# Patient Record
Sex: MALE | Race: WHITE
[De-identification: names, ages, dates, MRNs, and addresses within clinical notes are randomized per-mention and may not be internally consistent; named-entity substitution may affect disease eponyms.]

---

## 2017-06-09 ENCOUNTER — HOSPITAL ENCOUNTER (EMERGENCY)
Dept: HOSPITAL 25 - ED | Age: 52
LOS: 1 days | Discharge: HOME | End: 2017-06-10
Payer: COMMERCIAL
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
## 2017-06-09 DIAGNOSIS — S81.831A: Primary | ICD-10-CM
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
## 2017-06-09 PROCEDURE — 99282 EMERGENCY DEPT VISIT SF MDM: CPT

## 2017-06-10 VITALS — DIASTOLIC BLOOD PRESSURE: 75 MMHG | SYSTOLIC BLOOD PRESSURE: 140 MMHG

## 2017-06-10 NOTE — RAD
HISTORY: Penetrating trauma to right lower extremity



COMPARISONS: None



VIEWS: 4, Frontal and lateral views of the right foreleg



FINDINGS:



BONE DENSITY: Normal.

BONES: There is no displaced fracture.    

JOINTS: There is no arthropathy.    

ALIGNMENT: There is no dislocation. 

SOFT TISSUES: Unremarkable.



OTHER FINDINGS: There is no radiopaque foreign body.



IMPRESSION: 

NO ACUTE OSSEOUS INJURY. NO RADIOPAQUE FOREIGN BODY. IF SYMPTOMS PERSIST, RECOMMEND REPEAT

IMAGING.

## 2017-06-10 NOTE — ED
Lower Extremity





- HPI Summary


HPI Summary: 


Pt here w/ RLE puncture wound - foot slipped on dock and scraped shin on dock -

lake water exposed. Bleeding. He cleaned with some peroxide as well. Tetanus is 

UTD. Area if throbbing but he declines pain medication at this time. Denies 

numbness, tingling, weakness. 





- History of Current Complaint


Chief Complaint: EDLacSutureRecheck


Stated Complaint: RIGHT LEG LAC


Time Seen by Provider: 06/09/17 23:14


Hx Obtained From: Patient


Pain Intensity: 5





- Allergies/Home Medications


Allergies/Adverse Reactions: 


 Allergies











Allergy/AdvReac Type Severity Reaction Status Date / Time


 


No Known Allergies Allergy   Verified 06/09/17 22:23














PMH/Surg Hx/FS Hx/Imm Hx


Previously Healthy: Yes


Endocrine/Hematology History: 


   Denies: Hx Anticoagulant Therapy, Hx Blood Disorders





- Immunization History


Date of Tetanus Vaccine: unknown


Infectious Disease History: Yes


Infectious Disease History: 


   Denies: Traveled Outside the [place] in Last 30 Days





- Social History


Occupation: Employed Full-time


Lives: With Family


Alcohol Use: Occasionally


Hx Substance Use: No


Substance Use Type: Reports: None


Hx Tobacco Use: No


Smoking Status (MU): Never Smoked Tobacco





Review of Systems


Constitutional: Negative


Negative: Fever, Chills


Positive: no symptoms reported


Musculoskeletal: Other - shin is sore where he attained injury


Skin: Other - see HPI


Neurological: Negative


Negative: Weakness, Paresthesia, Numbness


Psychological: Normal


All Other Systems Reviewed And Are Negative: Yes





Physical Exam


Triage Information Reviewed: Yes


Vital Signs On Initial Exam: 


 Initial Vitals











Temp Pulse Resp BP Pulse Ox


 


 98.3 F   67   16   142/97   99 


 


 06/09/17 22:18  06/09/17 22:18  06/09/17 22:18  06/09/17 22:18  06/09/17 22:18











Vital Signs Reviewed: Yes


Appearance: Positive: Well-Appearing, No Pain Distress - mild, Well-Nourished


Skin: Positive: Warm - gouged puncture wound over Rt middle 1/3 of anterior 

tibial region - oozing blood


Head/Face: Positive: Normal Head/Face Inspection


Eyes: Positive: Normal


ENT: Positive: Hearing grossly normal


Respiratory/Lung Sounds: Positive: Breath Sounds Present


Cardiovascular: Positive: Normal, Pulses are Symmetrical in both Upper and 

Lower Extremities


Musculoskeletal: Positive: Normal, Strength/ROM Intact


Neurological: Positive: Normal, Sensory/Motor Intact, Alert, Oriented to Person 

Place, Time, CN Intact II-III


Psychiatric: Positive: Normal





Procedures





- Laceration/Wound Repair


  ** 1


Location: upper extremity - Rt LE


Description: Irregular - jagged puncture wound


Length, Depth and Shape: 3mm puncture wound x 4mm deep


Betadine Prep?: No


Irrigated w/ Saline (ccs): 250 - hibaclens solution


Laceration/Wound Explored: contaminated - wood flecks


Number of Sutures: 0


Layer Closure?: No


Sterile Dressing Applied?: Yes - sterile saline packing covered w/ sterile 

gauze and wrapped w/ coban





Diagnostics





- Vital Signs


 Vital Signs











  Temp Pulse Resp BP Pulse Ox


 


 06/09/17 23:18  98.3 F  67  18  142/87  100


 


 06/09/17 22:18  98.3 F  67  16  142/97  99














- Laboratory


Lab Statement: Any lab studies that have been ordered have been reviewed, and 

results considered in the medical decision making process.





Lower Extremity Course/Dx





- Course


Course Of Treatment: Discussed option of closure with mattress suture d/t area 

of high tension vs. leaving open healing by second intention alone vs. packing w

/ healing by second intention w/ Dr. Martinez. Pt agreed to packing and will 

return to  ED in 2 days for packing change, wound check. Started anbx d/t 

nature of injury. Initially considered cipro to cover pseudomonas however pt is 

very active and risk of tendon injury is a serious concern for him (plans to 

run a 13 mile race in a couple of weeks). Irrigated area well and care taken to 

pack in sterile fashion so started augmentin to cover staph/strep and a few 

atypicals. Close monitoring advised and danger s/sx of when to return to ED 

reviewed. Pt voices understanding.





- Diagnoses


Provider Diagnoses: 


 Puncture wound of right lower leg








- Physician Notifications


Discussed Care Of Patient With: Dr. Aceves





Discharge





- Discharge Plan


Condition: Stable


Disposition: HOME


Prescriptions: 


Amoxicillin/Clavulanate TAB* [Augmentin *] 875 mg PO BID #19 tab


Patient Education Materials:  Puncture Wound (ED)


Referrals: 


No Primary Care Phys,NOPCP [Primary Care Provider] - 


Additional Instructions: 


You have sterile saline packing in your wound at this time. Keep pressure 

dressing in place until wound dressing change in 2 days (return to ED for wound 

cleanse and packing change).


Rest, Ice, Compress, Elevate


Ibuprofen with food for pain - alternate with acetaminophen


Complete antibiotics


*If you develop redness, swelling, purulent drainage, fever, streaking, return 

to ED

## 2017-06-11 ENCOUNTER — HOSPITAL ENCOUNTER (EMERGENCY)
Dept: HOSPITAL 25 - ED | Age: 52
Discharge: HOME | End: 2017-06-11
Payer: COMMERCIAL

## 2017-06-11 VITALS — DIASTOLIC BLOOD PRESSURE: 80 MMHG | SYSTOLIC BLOOD PRESSURE: 130 MMHG
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
## 2017-06-11 DIAGNOSIS — S80.921D: Primary | ICD-10-CM

## 2017-06-11 PROCEDURE — 99282 EMERGENCY DEPT VISIT SF MDM: CPT

## 2017-07-04 NOTE — ED
ED Suture/Wound Check





- HPI Summary


HPI Summary: 


Pt here for Rt shin wound recheck and packing change from puncture wound injury 

2 days ago. Dressing remains in place and is dry. Area is sore - has not been 

resting - has been doing yard work (ie. squatting, kneeling, bending, etc). 

Denies fever, chills, N/V, numbness, tingling, weakness. Taking medications as 

directed.  





- History Of Current Complaint


Chief Complaint: EDGeneral


Stated Complaint: WOUND RECHECK


Time Seen by Provider: 06/11/17 18:15


Hx Obtained From: Patient


Pain Intensity: 4


Pain Scale Used: 0-10 Numeric





- Allergies/Home Medications


Allergies/Adverse Reactions: 


 Allergies











Allergy/AdvReac Type Severity Reaction Status Date / Time


 


No Known Allergies Allergy   Verified 06/09/17 22:23














PMH/Surg Hx/FS Hx/Imm Hx


Previously Healthy: Yes


Endocrine/Hematology History: 


   Denies: Hx Anticoagulant Therapy, Hx Blood Disorders





- Immunization History


Date of Tetanus Vaccine: unknown


Infectious Disease History: No


Infectious Disease History: 


   Denies: Traveled Outside the [place] in Last 30 Days





- Social History


Alcohol Use: Occasionally


Hx Substance Use: No


Substance Use Type: Reports: None


Hx Tobacco Use: No


Smoking Status (MU): Never Smoked Tobacco





Review of Systems


Negative: Fever, Chills


All Other Systems Reviewed And Are Negative: Yes





Physical Exam


Triage Information Reviewed: Yes


Vital Signs On Initial Exam: 


 Initial Vitals











Temp Pulse Resp BP Pulse Ox


 


 98.4 F   78   18   130/80   98 


 


 06/11/17 17:24  06/11/17 17:24  06/11/17 17:24  06/11/17 17:24  06/11/17 17:24











Vital Signs Reviewed: Yes


Appearance: Positive: Well-Appearing, No Pain Distress, Well-Nourished


Skin: Positive: Warm - Rt shin w/ erythema at edges of bandage border - bandage 

appears to be snuggly in place - dry; upon removal, serosanginous d/c from 

wound onto bandage; packing removed w/o difficulty, irrigated w/ sterile saline 

and repacked w/ sterile gauze strip; erythema reduces once bandage removed and 

no erythema about wound itself - suspect this was from pressure of bandage 

itself - no streaking - pt tolerated dressing change well


Head/Face: Positive: Normal Head/Face Inspection


Respiratory/Lung Sounds: Positive: Breath Sounds Present


Cardiovascular: Positive: Pulses are Symmetrical in both Upper and Lower 

Extremities


Musculoskeletal: Positive: Normal, Strength/ROM Intact


Neurological: Positive: Normal, Sensory/Motor Intact


Psychiatric: Positive: Normal





Procedures





- Procedure Summary


Procedure Summary: 





see PE for dressing change details





Diagnostics





- Vital Signs


 Vital Signs











  Temp Pulse Resp BP Pulse Ox


 


 06/11/17 19:13  99.2 F    


 


 06/11/17 17:26  98.4 F  79  18  130/80  99


 


 06/11/17 17:24  98.4 F  78  18  130/80  98














- Laboratory


Lab Statement: Any lab studies that have been ordered have been reviewed, and 

results considered in the medical decision making process.





Course/Dx





- Course


Course Of Treatment: Pt presents for wound recheck and dressing change. Appears 

to be healing well and w/o acute infection. Packign and dressing changed - 

looser this time to reduce risk of swelling and skin irritation. Advised pt not 

to be so aggressive with the area to prevent aggravation and prevention of 

wound healing. WIll elevate, rest and ice as needed. F/u w/ wound clinic as 

directed. Return w/ danger s/sx as needed. Pt agrees w/ plan.





- Clinical Impression


Provider Diagnoses: 


 Encounter for wound re-check








Discharge





- Discharge Plan


Condition: Stable


Disposition: HOME


Patient Education Materials:  Puncture Wound (ED)


[place]errals: 


[facility] [place]


No Primary Care Phys,NOPCP [Primary Care Provider] - 


Additional Instructions: 


Your wound appears to be healing well however you have developed some redness 

above and below the wound which was presumed to be from a prolonged pressure 

dressing worn for 2 days along with activity, increasing inflammation of the 

already inflammed tissue. Your packing was removed today, wound cleaned and 

repacked - triple anbx dressing + Telfa + gauze + ACE wrap. Due to the risky 

nature of this wound (ie. injured by wood and lake water exposure), it is 

advised that you follow-up with the wound clinic in the next 1-2 days. Call 

tomorrow morning to schedule an appointment. Continue your antibiotics unless 

directed otherwise.





Rest, ice, elevate


If wound dressing becomes saturated with blood, remove - wash wound with soap 

and water - rinse well and reapply triple anbx ointment + clean dressing





Wound Clinic:


[phone]





You may also establish with a PCP -referral line provided today.


*If you develop worsening of redness, purulent drainage, excessive bleeding, 

fever, chills, return to ED

## 2018-12-04 ENCOUNTER — HOSPITAL ENCOUNTER (INPATIENT)
Dept: HOSPITAL 25 - ED | Age: 53
LOS: 2 days | Discharge: HOME | DRG: 174 | End: 2018-12-06
Attending: HOSPITALIST | Admitting: HOSPITALIST
Payer: COMMERCIAL

## 2018-12-04 DIAGNOSIS — Z72.89: ICD-10-CM

## 2018-12-04 DIAGNOSIS — I49.3: ICD-10-CM

## 2018-12-04 DIAGNOSIS — Z83.3: ICD-10-CM

## 2018-12-04 DIAGNOSIS — I47.2: ICD-10-CM

## 2018-12-04 DIAGNOSIS — I21.4: Primary | ICD-10-CM

## 2018-12-04 DIAGNOSIS — R31.9: ICD-10-CM

## 2018-12-04 DIAGNOSIS — Z85.71: ICD-10-CM

## 2018-12-04 DIAGNOSIS — Z79.82: ICD-10-CM

## 2018-12-04 DIAGNOSIS — Z82.49: ICD-10-CM

## 2018-12-04 DIAGNOSIS — I25.10: ICD-10-CM

## 2018-12-04 DIAGNOSIS — Z79.02: ICD-10-CM

## 2018-12-04 DIAGNOSIS — R79.1: ICD-10-CM

## 2018-12-04 LAB
BASOPHILS # BLD AUTO: 0 10^3/UL (ref 0–0.2)
EOSINOPHIL # BLD AUTO: 0.1 10^3/UL (ref 0–0.6)
HCT VFR BLD AUTO: 44 % (ref 42–52)
HGB BLD-MCNC: 15.5 G/DL (ref 14–18)
INR PPP/BLD: 0.95 (ref 0.77–1.02)
LYMPHOCYTES # BLD AUTO: 1.3 10^3/UL (ref 1–4.8)
MCH RBC QN AUTO: 33 PG (ref 27–31)
MCHC RBC AUTO-ENTMCNC: 35 G/DL (ref 31–36)
MCV RBC AUTO: 94 FL (ref 80–94)
MONOCYTES # BLD AUTO: 0.7 10^3/UL (ref 0–0.8)
NEUTROPHILS # BLD AUTO: 6.9 10^3/UL (ref 1.5–7.7)
NRBC # BLD AUTO: 0 10^3/UL
NRBC BLD QL AUTO: 0.1
PLATELET # BLD AUTO: 176 10^3/UL (ref 150–450)
RBC # BLD AUTO: 4.7 10^6/UL (ref 4–5.4)
WBC # BLD AUTO: 9 10^3/UL (ref 3.5–10.8)

## 2018-12-04 PROCEDURE — 85610 PROTHROMBIN TIME: CPT

## 2018-12-04 PROCEDURE — 93454 CORONARY ARTERY ANGIO S&I: CPT

## 2018-12-04 PROCEDURE — 85347 COAGULATION TIME ACTIVATED: CPT

## 2018-12-04 PROCEDURE — 93005 ELECTROCARDIOGRAM TRACING: CPT

## 2018-12-04 PROCEDURE — 4A023N7 MEASUREMENT OF CARDIAC SAMPLING AND PRESSURE, LEFT HEART, PERCUTANEOUS APPROACH: ICD-10-PCS | Performed by: SPECIALIST

## 2018-12-04 PROCEDURE — 71045 X-RAY EXAM CHEST 1 VIEW: CPT

## 2018-12-04 PROCEDURE — 83605 ASSAY OF LACTIC ACID: CPT

## 2018-12-04 PROCEDURE — 84484 ASSAY OF TROPONIN QUANT: CPT

## 2018-12-04 PROCEDURE — 93306 TTE W/DOPPLER COMPLETE: CPT

## 2018-12-04 PROCEDURE — 83735 ASSAY OF MAGNESIUM: CPT

## 2018-12-04 PROCEDURE — C1769 GUIDE WIRE: HCPCS

## 2018-12-04 PROCEDURE — 85730 THROMBOPLASTIN TIME PARTIAL: CPT

## 2018-12-04 PROCEDURE — C1876 STENT, NON-COA/NON-COV W/DEL: HCPCS

## 2018-12-04 PROCEDURE — 99157 MOD SED OTHER PHYS/QHP EA: CPT

## 2018-12-04 PROCEDURE — 99285 EMERGENCY DEPT VISIT HI MDM: CPT

## 2018-12-04 PROCEDURE — 83880 ASSAY OF NATRIURETIC PEPTIDE: CPT

## 2018-12-04 PROCEDURE — 80053 COMPREHEN METABOLIC PANEL: CPT

## 2018-12-04 PROCEDURE — B2111ZZ FLUOROSCOPY OF MULTIPLE CORONARY ARTERIES USING LOW OSMOLAR CONTRAST: ICD-10-PCS | Performed by: SPECIALIST

## 2018-12-04 PROCEDURE — 36415 COLL VENOUS BLD VENIPUNCTURE: CPT

## 2018-12-04 PROCEDURE — 85027 COMPLETE CBC AUTOMATED: CPT

## 2018-12-04 PROCEDURE — 80048 BASIC METABOLIC PNL TOTAL CA: CPT

## 2018-12-04 PROCEDURE — 84443 ASSAY THYROID STIM HORMONE: CPT

## 2018-12-04 PROCEDURE — 87641 MR-STAPH DNA AMP PROBE: CPT

## 2018-12-04 PROCEDURE — C1725 CATH, TRANSLUMIN NON-LASER: HCPCS

## 2018-12-04 PROCEDURE — 82553 CREATINE MB FRACTION: CPT

## 2018-12-04 PROCEDURE — 80061 LIPID PANEL: CPT

## 2018-12-04 PROCEDURE — 85025 COMPLETE CBC W/AUTO DIFF WBC: CPT

## 2018-12-04 PROCEDURE — 81003 URINALYSIS AUTO W/O SCOPE: CPT

## 2018-12-04 PROCEDURE — 02703ZZ DILATION OF CORONARY ARTERY, ONE ARTERY, PERCUTANEOUS APPROACH: ICD-10-PCS | Performed by: SPECIALIST

## 2018-12-04 PROCEDURE — 99156 MOD SED OTH PHYS/QHP 5/>YRS: CPT

## 2018-12-04 PROCEDURE — 82550 ASSAY OF CK (CPK): CPT

## 2018-12-04 PROCEDURE — 027034Z DILATION OF CORONARY ARTERY, ONE ARTERY WITH DRUG-ELUTING INTRALUMINAL DEVICE, PERCUTANEOUS APPROACH: ICD-10-PCS | Performed by: SPECIALIST

## 2018-12-04 PROCEDURE — C1887 CATHETER, GUIDING: HCPCS

## 2018-12-04 RX ADMIN — SODIUM CHLORIDE SCH MLS/HR: 900 IRRIGANT IRRIGATION at 16:56

## 2018-12-04 RX ADMIN — ATORVASTATIN CALCIUM SCH MG: 80 TABLET, FILM COATED ORAL at 20:59

## 2018-12-04 RX ADMIN — TICAGRELOR SCH MG: 90 TABLET ORAL at 20:59

## 2018-12-04 RX ADMIN — SODIUM CHLORIDE SCH MLS/HR: 900 IRRIGANT IRRIGATION at 13:51

## 2018-12-04 NOTE — HP
CC:  Dr. Reardon; Dr. Melendrez *

 

HISTORY AND PHYSICAL:

 

DATE OF ADMISSION:  18

 

TIME OF EVALUATION:  9:40 a.m.

 

PRIMARY CARE PROVIDER:  Dr. Reardon.

 

CONSULTING CARDIOLOGIST:  Dr. Melendrez.

 

CHIEF COMPLAINT:  Chest pain.

 

HISTORY OF PRESENT ILLNESS:  Mr. Hamilton is a 53-year-old male with no 
significant past medical history who presented to the emergency room with 
complaints of chest pain.

 

He states that he was in his usual state of health until last night while he 
was watching TV and working on his computer and he started to experience 
retrosternal chest discomfort that he rates as an 8/10.  He states that the 
pain was not radiating.  He cannot describe the nature of the pain, but he 
thought that due to the location, this could be heartburn, so he asked his wife 
to go buy some Tums. He took the medication, had some relief, but states that 
he was uncomfortable the whole night.  This morning, the patient woke up and 
was planning to go and exercise as usual, but as the discomfort was still 
present, he decided to come to the emergency room for further evaluation.  He 
denies palpitations, nausea, vomiting, diaphoresis, or dyspnea.

 

The patient states he saw his PCP a couple weeks ago and was told "everything 
was fine."  He describes having "borderline cholesterol" and the plan was to 
continue diet and exercise.  He states that he has not been exercising as much 
as he usually does because he hurt his back and was taking some ibuprofen for 
pain.

 

He denies fever, chills, cough, or any similar episodes in the past.

 

PAST MEDICAL HISTORY:  The patient denies.

 

FAMILY HISTORY:  His maternal grandfather  of a heart attack in his early 
40s. Father has diabetes.  Mother had polymyositis and Hodgkin's lymphoma.

 

SOCIAL HISTORY:  The patient denies tobacco and drug use.  He occasionally 
drinks alcohol.  Surrogate decision maker is his wife, Daiana Hamilton, phone 
number is [phone].

 

REVIEW OF SYSTEMS:  A 14-review of systems was performed and all the pertinent 
negatives and positive findings as per the HPI.

 

                               PHYSICAL EXAMINATION

 

GENERAL:  The patient is a pleasant, middle-aged gentleman, lying in the ED 
stretcher, in no acute distress.

 

VITAL SIGNS:  Temperature 97.6, heart rate 66, respiratory rate 16, oxygen 
saturation 98% on room air, blood pressure 133/84.

 

HEENT:  Pupils are equal.  Moist mucous membranes.

 

CHEST:  Breath sounds present bilaterally with no added sounds.

 

CVS:  Normal S1 and S2.  Regular rate and rhythm.

 

ABDOMEN:  Soft, bowel sounds are present.

 

EXTREMITIES:  No edema.

 

NEUROLOGIC:  He is alert and oriented x3.  Able to move all 4 extremities.

 

 DIAGNOSTIC STUDIES/LAB DATA:  The patient had a CBC that showed a WBC of 9, 
hemoglobin of 15.5, hematocrit of 44, platelets of 176 with 76% neutrophils.  
APTT is 30.8.  Chemistry showed sodium 137, potassium 4.4, chloride 103, bicarb 
27, BUN of 18, creatinine of 0.9, glucose of 121, lactic acid of 1, calcium 10.1
, magnesium 1.9.  LFTs showed total bilirubin of 0.6, AST of 56, ALT of 30, alk 
phos of 81, CPK of 142, CK-MB of 107, troponin 3.06, BNP 43, TSH 1.4.

 

Urinalysis was negative.

 

EKG done on 18 at 8:08 showed sinus bradycardia at 49 beats per minute 
with Q waves in II, III and aVF.  Peak Q waves in V3 and V4.  No prior EKG to 
compare.

 

Chest x-ray showed no active cardiopulmonary disease.

 

Transthoracic echocardiogram showed mild concentric LVH with ejection fraction 
of 45% to 50%, mildly decreased left ventricular systolic function with mid 
inferior and apical inferior wall segments are hypokinetic.

 

ASSESSMENT AND PLAN:  Mr. Hamilton is a 53-year-old male with no significant past 
medical history who presents to the emergency room with more than 12 hours of 
chest discomfort, found to have a non-ST elevation myocardial infarction.

 

1.  Non-ST elevation myocardial infarction.  The patient is having an inferior 
wall myocardial infarction with wall motion abnormalities seen on his EKG.  He 
has had pain for more than 12 hours and he already shows elevation of AST, CPK, 
CK-MB and troponin.  He received aspirin, nitroglycerin, metoprolol, and 
heparin bolus in the emergency room and he states that his chest pain is less 
intense now, but is still present and he grades it a 7/10.  He received 1 dose 
of morphine, another dose of nitroglycerin.  We will continue heparin drip and 
the plan is for him to undergo cardiac cath this morning.  I am going to add 
high dose atorvastatin, but the remainder of the management will depend on his 
cardiac cath findings.

2.  DVT prophylaxis.  The patient has a score of 2 on DVT Prophylaxis Risk 
Assessment Guide and he will be on a heparin drip.

3.  Code status is full.

 

TIME SPENT:  Approximately 50 minutes were spent with the patient and wife's 
interview, medical records review, physical examination to complete the 
admission, more than half of this time was spent face-to-face with the patient 
and coordination of care.

 

[id number] #: [id number]

CHAZ

## 2018-12-04 NOTE — CONS
CC:  Dr. Lebron Reardon; Dr. Surjit Medeiros *

 

CARDIOLOGY CONSULTATION:

 

DATE OF CONSULT:  12/04/18

 

INDICATION FOR CONSULTATION:  Chest pain, acute coronary syndrome.

 

HISTORY OF PRESENT ILLNESS:  The patient is a 53-year-old gentleman with very 
little past medical history, who came to the emergency room with chest pain.  
The patient states that he was working comfortably last night when he started 
developing chest pain.  He did take some Tums for the discomfort with very 
little relief.  He slept poorly overnight.  This morning when he woke up, he 
still had some mild chest discomfort and decided to come to the emergency room.
  On arrival to the emergency room, his EKG shows normal sinus rhythm.  He has J
-point elevation.  He does have nondiagnostic Q waves in the inferior leads.  
The patient's initial troponin level was 3.0.  The decision was to have the 
patient to undergo cardiac catheterization.

 

PAST MEDICAL HISTORY:  None.

 

PAST SURGICAL HISTORY:  Tonsillectomy in the distant past.

 

MEDICATIONS:  None.

 

ALLERGIES:  None.

 

FAMILY HISTORY:  No family history of early coronary artery disease.

 

SOCIAL HISTORY:  He is .  He exercises regularly.  He denies tobacco.  
Rare alcohol intake.

 

REVIEW OF SYSTEMS:  Negative for fevers or chills.  Negative for changes in 
bowel or bladder habits.  Negative for change in weight.

 

PHYSICAL EXAM:  Height is 5 feet 11 inches, weight 203 pounds, temperature 98.6
, heart rate is 60, blood pressure 130/71, respiratory rate is 19, oxygen 
saturation 98% on room air.  Sclerae anicteric.  Oropharynx is pink without 
erythema. Carotids are 2+ without bruits.  JVD is normal.  Thyroid is normal.  
Cardiac Exam: S1, S2 without any murmurs, rubs, or gallops.  Lungs are clear to 
auscultation bilaterally with no dullness to percussion.  Abdomen is soft, 
nontender, nondistended.  Normoactive bowel sounds.  Extremities:  Showed no 
edema.  He has 2+ throughout.  The patient is awake, alert, and oriented.  He 
moves all 4 extremities equally.

 

DIAGNOSTIC STUDIES/LAB DATA:  CBC within normal limits.  Chemistries within 
normal limits.  Initial troponin level 3.06, TSH 1.4.

 

IMPRESSION:  This is a 53-year-old gentleman with little past medical history, 
who was admitted to the emergency room with chest pain.  The patient's initial 
troponin level is 3.0.  The patient was given heparin, aspirin, and Brilinta in 
the emergency room.  The patient will undergo cardiac catheterization for 
definitive evaluation.

 

 [id number] #: [id number]

CHAZ

## 2018-12-04 NOTE — ECHO
Patient:      JOSE FISH 

Med Rec#:     [id number]            :          1965          

Date:         2018            Age:          53y                 

Account#:     [id number]          Height:       180 cm / 70.9 in

Accession#:   [id number]           Weight:       88.45 kg / 194.9 lbs

Sex:          M                     BSA:          2.08

Room#:        ED 3                  

Admit Date#:  2018          

Type:         Inpatient

 

Referring:    Berny Melendrez MD

Reading:      Berny Melendrez MD

Sonographer:  Daiana Gonzales,DESHAWNCS,RDMS

CC:           Lebron Reardon MD

______________________________________________________________________

 

Transthoracic Echocardiogram

 

Indication:

CP

BP:           145/90

HR:           55

Rhythm:       Bradycardia

 

Findings     

History:

Previously healthy. Family history of CAD. 

 

Technical Comments:

The study quality is fair.  

 

Left Ventricle:

The left ventricular chamber size is normal. Mild concentric left

ventricular hypertrophy is observed. There is a focal wall motion

abnormality present. There is mildly decreased left ventricular systolic

function. The estimated ejection fraction is 45-50%.  There is no

consistent Doppler evidence of clinically significant     diastolic

dysfunction. The  mid inferior, and  apical inferior wall segments are

hypokinetic (score 2). Overall wallmotion score index is  2.00 

 

Left Atrium:

The left atrium is mildly dilated. 

 

Right Ventricle:

The right ventricular chamber size and systolic function are within

normal limits. 

 

Right Atrium:

The right atrial cavity size is normal. 

 

Aortic Valve:

The aortic valve is trileaflet. There is no evidence of aortic valve

thickening. Systolic excursion of the aortic valve is normal. There is

no evidence of aortic regurgitation. There is no evidence of aortic

stenosis. 

 

Mitral Valve:

The mitral valve leaflets appear normal. There is a trace of mitral

regurgitation. There is no evidence of mitral stenosis. 

 

Tricuspid Valve:

The tricuspid valve leaflets are normal.  There is trace tricuspid

regurgitation.  No pulmonary hypertension is noted. 

 

Pulmonic Valve:

The pulmonic valve appears normal. There is a trace pulmonic

regurgitation.  

 

Pericardium:

There is no significant pericardial effusion. 

 

Aorta:

The aortic root appears normal. There is no dilatation of the aortic

arch. 

 

Pulmonary Artery:

The main pulmonary artery appears normal. 

 

Venous:

The inferior vena cava is dilated.  There is a greater than 50%

respiratory change in the inferior vena cava dimension. 

 

Summary:

There was not any prior study for comparison. 

 

Conclusions

Mild concentric left ventricular hypertrophy is observed.

The estimated ejection fraction is 45-50%. 

There is mildly decreased left ventricular systolic function.

The  mid inferior, and  apical inferior wall segments are hypokinetic

(score 2).

There is no evidence of aortic stenosis.

There is a trace of mitral regurgitation.

There is trace tricuspid regurgitation. 

There is no significant pericardial effusion.

 

Measurements     

Name                    Value         Normal Range            

RVIDd (AP) 2D           3.3 cm        (0.9 - 2.6)             

RVDdMajor (2D)          3.6 cm        (2.2 - 4.4)             

RAd ISD 4CH             4.8 cm        (3.4 - 4.9)             

RA (A4C)W               4.3 cm        (2.9 - 4.6)             

IVSd (2D)               1.3 cm        (0.6 - 1)               

LVPWd (2D)              1.3 cm        (0.6 - 1)               

LVIDd (2D)              4.7 cm        (3.6 - 5.4)             

LVIDs (2D)              3.8 cm        -                        

LV FS (2D)              20 %          (25 - 45)               

Aortic Annulus          2.6 cm        (1.4 - 2.6)             

Ao root diameter (2D)   3.6 cm        (2.1 - 3.5)             

Ascending Ao            3.2 cm        (2.1 - 3.4)             

Aortic arch             3.4 cm        (1.8 - 3.4)             

LA dimension (AP) 2D    4.1 cm        (2.3 - 3.8)             

LAd ISD 4CH             5.2 cm        (2.9 - 5.3)             

LA ISD 4CH W            4.9 cm        (2.5 - 4.5)             

 

Name                    Value         Normal Range            

LA ESV SP 4CH (A/L)     84.83 ml      -                        

LA ESV SP 2CH (A/L)     78.07 ml      -                        

LA ESV BP (A/L)         82.29 ml      -                        

LA ESV BP (A/L) index   39 ml/m2      -                        

LA ESV SP 4CH (MOD)     80.02 ml      -                        

LA ESV SP 2CH (MOD)     74.16 ml      -                        

 

Name                    Value         Normal Range            

MV E-wave Vmax          0.5 m/sec     -                        

MV deceleration time    340 msec      -                        

MV A-wave Vmax          0.5 m/sec     -                        

MV E:A ratio            1 ratio       -                        

P. vein S-wave Vmax     0.5 m/sec     -                        

P. vein D-wave Vmax     0.3 m/sec     -                        

P. vein S:D Vmax ratio  1.9 ratio     -                        

P. vein A-wave duration 118 msec      -                        

LV septal e' Vmax       0.07 m/sec    -                        

LV lateral e' Vmax      0.09 m/sec    -                        

LV E:e' septal ratio    8 ratio       -                        

LV E:e' lateral ratio   6 ratio       -                        

 

Name                    Value         Normal Range            

AV Vmax                 1 m/sec       -                        

AV VTI                  24 cm         -                        

AV peak gradient        4 mmHg        -                        

AV mean gradient        2.2 mmHg      -                        

LVOT Vmax               0.8 m/sec     -                        

LVOT VTI                18.4 cm       -                        

LVOT peak gradient      2.6 mmHg      -                        

LVOT mean gradient      1.2 mmHg      -                        

SHAYY Vmax                0.9 m/sec     -                        

 

Name                    Value         Normal Range            

TR Vmax                 2.5 m/sec     -                        

TR peak gradient        25 mmHg       -                        

RAP                     3 mmHg        -                        

RVSP                    28 mmHg       -                        

IVC diameter            2.3 cm        -                        

 

Name                    Value         Normal Range            

PV Vmax                 0.6 m/sec     -                        

PV peak gradient        1.4 mmHg      -                        

 

Wallmotion

 

BAS          Not Seen

BA           Not Seen

BAL          Not Seen

MARTIN          Not Seen

BI           Not Seen

BIS          Not Seen

MAS          Not Seen

MA           Not Seen

MAL          Not Seen

MIL          Not Seen

MI           Hypokinetic

MIS          Not Seen

AS           Not Seen

AA           Not Seen

AL           Not Seen

AI           Hypokinetic

APEX         Not Seen

 

Electronically signed by: Berny Melendrez MD on 2018 09:56:20

## 2018-12-05 LAB
BASOPHILS # BLD AUTO: 0 10^3/UL (ref 0–0.2)
EOSINOPHIL # BLD AUTO: 0.1 10^3/UL (ref 0–0.6)
HCT VFR BLD AUTO: 38 % (ref 42–52)
HGB BLD-MCNC: 13.6 G/DL (ref 14–18)
LYMPHOCYTES # BLD AUTO: 1.5 10^3/UL (ref 1–4.8)
MCH RBC QN AUTO: 34 PG (ref 27–31)
MCHC RBC AUTO-ENTMCNC: 36 G/DL (ref 31–36)
MCV RBC AUTO: 94 FL (ref 80–94)
MONOCYTES # BLD AUTO: 0.8 10^3/UL (ref 0–0.8)
NEUTROPHILS # BLD AUTO: 5.1 10^3/UL (ref 1.5–7.7)
NRBC # BLD AUTO: 0.1 10^3/UL
NRBC BLD QL AUTO: 0.7
PLATELET # BLD AUTO: 119 10^3/UL (ref 150–450)
RBC # BLD AUTO: 4.03 10^6/UL (ref 4–5.4)
WBC # BLD AUTO: 7.5 10^3/UL (ref 3.5–10.8)

## 2018-12-05 RX ADMIN — METOPROLOL SUCCINATE SCH MG: 25 TABLET, EXTENDED RELEASE ORAL at 09:06

## 2018-12-05 RX ADMIN — ATORVASTATIN CALCIUM SCH MG: 80 TABLET, FILM COATED ORAL at 20:12

## 2018-12-05 RX ADMIN — TICAGRELOR SCH MG: 90 TABLET ORAL at 20:13

## 2018-12-05 RX ADMIN — ASPIRIN SCH MG: 81 TABLET, CHEWABLE ORAL at 09:07

## 2018-12-05 RX ADMIN — TICAGRELOR SCH MG: 90 TABLET ORAL at 09:07

## 2018-12-05 NOTE — PN
Subjective


Date of Service: 12/05/18 - CC: CP, resolved


Interval History: 





No further CP since stenting yesterday.


Walking the halls w/o problems.





Lots of questions.





Medications


Active Medications: 








Acetaminophen (Tylenol Tab*)  650 mg PO Q4H PRN


   PRN Reason: HEADACHE/PAIN


Aspirin (Aspirin 81 Mg Chew Tab*)  81 mg PO DAILY [facility]


   Last Admin: 12/05/18 09:07 Dose:  81 mg


Atorvastatin Calcium (Lipitor*)  80 mg PO 2200 [facility]


   Last Admin: 12/04/18 20:59 Dose:  80 mg


Docusate Sodium (Colace Cap*)  100 mg PO DAILY PRN


   PRN Reason: CONSTIPATION


Metoprolol Succinate (Toprol Xl Tab*)  25 mg PO DAILY [facility]


   Last Admin: 12/05/18 09:06 Dose:  25 mg


Morphine Sulfate (Morphine Vial*)  1 mg IV Q1H PRN


   PRN Reason: SEVERE PAIN


   Last Admin: 12/04/18 11:08 Dose:  1 mg


Nitroglycerin (Nitroglycerin Tab 0.4 Mg*)  0.4 mg SL Q5M PRN


   PRN Reason: ANGINA


Ondansetron HCl (Zofran Inj*)  4 mg IV Q4H PRN


   PRN Reason: NAUSEA


Ticagrelor (Brilinta*)  90 mg PO BID [facility]


   Last Admin: 12/05/18 09:07 Dose:  90 mg


Zolpidem Tartrate (Ambien Tab*)  5 mg PO BEDTIME PRN


   PRN Reason: INSOMNIA








Objective


Vital Signs:











Temp Pulse Resp BP Pulse Ox


 


 99.2 F   63   16   125/76   100 


 


 12/05/18 15:42  12/05/18 15:42  12/05/18 15:42  12/05/18 15:42  12/05/18 15:42











Oxygen Devices in Use Now: None


Appearance: fit middle aged male in no distress, sitting with family.


Eyes: No Scleral Icterus, PERRLA


Ears/Nose/Mouth/Throat: Clear Oropharnyx, Mucous Membranes Moist


Neck: NL Appearance and Movements; NL JVP, Trachea Midline


Respiratory: Symmetrical Chest Expansion and Respiratory Effort, Clear to 

Auscultation


Cardiovascular: NL Sounds; No Murmurs; No JVD, RRR - no rubs


Abdominal: NL Sounds; No Tenderness; No Distention


Extremities: No Edema - Right wrist cath site w/o hematoma or ecchymosis.


Skin: No Rash or Ulcers


Neurological: Alert and Oriented x 3, NL Gait, NL Muscle Strength and Tone


Lines/Tubes/Other Access: Clean, Dry and Intact Peripheral IV


Laboratory Results: 


 





 12/05/18 06:15 





 12/05/18 06:15 





 











INR (Anticoag Therapy)  0.95  (0.77-1.02)   12/04/18  08:01    


 


APTT  30.8 seconds (26.0-36.3)   12/04/18  08:01    


 


Total Bilirubin  0.60 mg/dL (0.2-1.0)   12/04/18  08:01    


 


AST  56 U/L (13-39)  H  12/04/18  08:01    


 


ALT  30 U/L (7-52)   12/04/18  08:01    


 


Alkaline Phosphatase  81 U/L ()   12/04/18  08:01    


 


CK-MB (CK-2)  150.8 ng/mL (0.6-6.3)  H  12/05/18  02:20    


 


B-Natriuretic Peptide  43 pg/mL (<=100)   12/04/18  08:01    


 


Total Protein  7.2 g/dL (6.4-8.9)   12/04/18  08:01    


 


Albumin  4.1 g/dL (3.2-5.2)   12/04/18  08:01    


 


Globulin  3.1 g/dL (2-4)   12/04/18  08:01    


 


Albumin/Globulin Ratio  1.3  (1-3)   12/04/18  08:01    


 


Triglycerides  95 mg/dL  12/05/18  06:15    


 


Cholesterol  154 mg/dL  12/05/18  06:15    


 


LDL Cholesterol  98 mg/dL  12/05/18  06:15    


 


HDL Cholesterol  36.6 mg/dL  12/05/18  06:15    


 


TSH  1.41 mcIU/mL (0.34-5.60)   12/04/18  08:01    








 











  12/04/18 12/04/18 12/04/18





  08:01 14:28 20:50


 


Troponin I  3.06 H*  > 82.00 H*  > 82.00 H*














  12/05/18





  02:20


 


Troponin I  70.93 H*











Diagnostic Imaging: 





Cardiac cathetierzation 12/4/18:  Occluded RCA with L-> collateral flow (Stented

).  Cx moderate disease, LAD nominal disease.  





ECHO 12/4/18:  inferior wall + apex hypo/akinetic, EF 45-50%, trace valve leaks.


EKG Data: 





ECG 12/5/18: NSR, Q's III, aVF, mild STelevation III, aVF.





Monitor:  PVC's.





Assessment/Plan





54 yo male who presented several hours into an IWMI s/p stenting and CP free.


PVC's but no sustained dysrhythmias post intervention.


CAD risks include FHx (maternal GF).





CAD:


   Post stent: continue ASA, Brilinta.


      -enroll in cardiac rehab.


      -Continue BB


      -Continue high dose statin.


      -May benefit from ACEI if able to get on with BP.


      -Monitor with ambulation overnight.


      -outpatient may consider looking for procoagulent as not a lot of CAD 

risks.





I answered questions on activity, goals of meds, damage of heart and more.





Tentative discharge in AM unless dysrhythmias.


Pt should follow up with Dr Melendrez next week.

## 2018-12-05 NOTE — PN
Subjective


Date of Service: 12/05/18


Interval History: 


HOSPITALIST PROGRESS NOTE


Patient seen and examined at bedside. Care reviewed and d/w Noelle Lopez RN.


He feels well today, denies chest pain, palpitations, dyspnea. Able to ambulate 

around unit with no symptoms.


Family History: Unchanged from Admission


Social History: Unchanged from Admission


Past Medical History: Unchanged from Admission





Objective


Active Medications: 








Acetaminophen (Tylenol Tab*)  650 mg PO Q4H PRN


   PRN Reason: HEADACHE/PAIN


Aspirin (Aspirin 81 Mg Chew Tab*)  81 mg PO DAILY [facility]


   Last Admin: 12/05/18 09:07 Dose:  81 mg


Atorvastatin Calcium (Lipitor*)  80 mg PO 2200 [facility]


   Last Admin: 12/04/18 20:59 Dose:  80 mg


Docusate Sodium (Colace Cap*)  100 mg PO DAILY PRN


   PRN Reason: CONSTIPATION


Metoprolol Succinate (Toprol Xl Tab*)  25 mg PO DAILY [facility]


   Last Admin: 12/05/18 09:06 Dose:  25 mg


Morphine Sulfate (Morphine Vial*)  1 mg IV Q1H PRN


   PRN Reason: SEVERE PAIN


   Last Admin: 12/04/18 11:08 Dose:  1 mg


Nitroglycerin (Nitroglycerin Tab 0.4 Mg*)  0.4 mg SL Q5M PRN


   PRN Reason: ANGINA


Ondansetron HCl (Zofran Inj*)  4 mg IV Q4H PRN


   PRN Reason: NAUSEA


Ticagrelor (Brilinta*)  90 mg PO BID [facility]


   Last Admin: 12/05/18 09:07 Dose:  90 mg


Zolpidem Tartrate (Ambien Tab*)  5 mg PO BEDTIME PRN


   PRN Reason: INSOMNIA








 Vital Signs - 8 hr











  12/05/18 12/05/18 12/05/18





  06:00 06:01 07:00


 


Temperature   


 


Pulse Rate  53 56


 


Respiratory 15 15 18





Rate   


 


Blood Pressure  118/63 93/52





(mmHg)   


 


O2 Sat by Pulse  96 97





Oximetry   














  12/05/18 12/05/18 12/05/18





  07:01 07:08 07:59


 


Temperature   99.3 F


 


Pulse Rate 56 52 


 


Respiratory 14 19 





Rate   


 


Blood Pressure  102/56 





(mmHg)   


 


O2 Sat by Pulse 95 95 





Oximetry   














  12/05/18 12/05/18 12/05/18





  08:00 08:01 09:00


 


Temperature   


 


Pulse Rate 66 66 63


 


Respiratory 17 17 17





Rate   


 


Blood Pressure 132/83  





(mmHg)   


 


O2 Sat by Pulse 95 94 98





Oximetry   














  12/05/18 12/05/18 12/05/18





  09:01 10:00 10:02


 


Temperature   


 


Pulse Rate 62 73 84


 


Respiratory 16 28 21





Rate   


 


Blood Pressure 119/83  122/101





(mmHg)   


 


O2 Sat by Pulse 100 95 83





Oximetry   














  12/05/18 12/05/18





  11:00 11:46


 


Temperature  100.0 F


 


Pulse Rate  


 


Respiratory 16 





Rate  


 


Blood Pressure  





(mmHg)  


 


O2 Sat by Pulse  





Oximetry  











Oxygen Devices in Use Now: None


Appearance: Pleasant gentleman sitting up in bed in NAD.


Eyes: No Scleral Icterus


Ears/Nose/Mouth/Throat: Mucous Membranes Moist


Neck: Trachea Midline


Respiratory: Symmetrical Chest Expansion and Respiratory Effort, Clear to 

Auscultation


Cardiovascular: NL Sounds; No Murmurs; No JVD, RRR


Extremities: No Edema, - - Right wrist site with no hematoma or ecchymosis, 

good pulses, sensation intact


Neurological: Alert and Oriented x 3, NL Muscle Strength and Tone


Result Diagrams: 


 12/05/18 06:15





 12/05/18 06:15





Assess/Plan/Problems-Billing


Assessment: 


Mr Hamilton is a 54yo M with NS PMH who presented to ED with c/o chest pain, 

found to have a NSTEMI.





- Patient Problems


(1) NSTEMI (non-ST elevated myocardial infarction)


Comment: - Cardiac cath revealed occluded RCA s/p stent, 60% mid LCx, 70% OM3.


- Echo showed EF 45-50% with inferior wall hypokinesis.


- Continue management with Aspirin, Brilinta, Atorvastatin, Metoprolol.


- Encourage activity.


- Transfer to Telemetry.   





(2) V-tach


Comment: - Patient had non sustained Vtach, asymptomatic, prior to cath - no 

further episodes so far.


- Continue Metoprolol and monitor on Telemetry.   





(3) DVT prophylaxis


Comment: - SCD while in bed.


- Encourage ambulation.   





(4) Full code status





Status and Disposition: 


Inpatient.

## 2018-12-05 NOTE — CATH
CC:  Dr. Reardon; Dr. Melendrez *

 

INTERVENTIONAL REPORT:

 

DATE OF PROCEDURE:  12/04/18

 

INDICATION FOR THE PROCEDURE:  I asked by Dr. Melendrez to perform intervention 
into totally occluded distal right coronary artery on Mr. Hamilton in light of 
his late presentation of probable inferior wall myocardial infarction with 
continued chest symptoms.

 

EQUIPMENT UTILIZED:

1.  Guide catheter - a 6-Lithuanian Heartrail 3 IR 1.5 curved catheter.

2.  Interventional wire - 300 length All Star wire, a 300 length ChoICE Floppy 
wire, a 190 length All Star wire.

3.  Angioplasty balloon catheters - a 2.0 x 12 mm long Emerge balloon, a 2.5 x 
12 mm long Emerge balloon.

4.  Stent utilized - a 2.75 x 12 mm long Synergy drug-eluting stent.

5.  Post stent deployment balloon inflation catheter - a 2.75 x 12 mm long NC 
Emerge.

 

MEDICATIONS GIVEN DURING THE INTERVENTIONAL PROCEDURE:  After a CT was checked 
and found to be subtherapeutic, Angiomax bolus and Angiomax drip was started.  
The patient received nitroglycerin into coronary as well.

 

DESCRIPTION OF PROCEDURE:  Guiding views were obtained utilizing the Heartrail 
3 6- Lithuanian IR1.5 curved catheter.  Following this, a CT had been checked and 
found to be subtherapeutic. The patient received Angiomax bolus and Angiomax 
drip. Initially, an Emerge 2.5 x 12 mm balloon over a exchange length All Start 
300 cm wire down the right coronary artery.  When the lesion at the PDA was 
identified, the decision was made to double wire and perform kissing balloon.  
This was performed utilizing the 2.0 x 12 mm long Emerge balloon in the PDA and 
the 2.5 x 12 Emerge balloon in the main body of the right coronary artery.  
Following this, the wire was left in place in the PDA and the 2.75 x 12 mm long 
stent was deployed. The wires were then exchanged with the distal right 
coronary wire inserted into the PDA and the PDA wire inserted into the distal 
right and postdeployment balloon inflations were made with the high pressure.  
Following this, the artery was assessed for results. At the end of the case, 
catheters and sheath were removed and hemostasis was obtained with a Vasc band.
  The reverse Barbeau was found to be a B.



RESULTS: 

   - Successful reopening of totally occluded right coronary artery with 
subsequent balloon angioplasty of PDA and distal right coronary artery and 
stenting of distal right coronary artery with a 2.75 x 12 mm long Synergy drug 
eluding stent reducing a significant 90% lesion to 0% with TIMI3 flow, no 
dissection seen.

 



 

OVERALL ASSESSMENT:  Successful reconstitution of right coronary artery, 
unfortunately albeit with a late presentation.  He already has significant 
cardiac enzyme spill and I anticipate a abrupt significant raise after opening 
up this artery.  Dual antiplatelet therapy for clearly a years' time is highly 
recommended and aggressive risk factor management under Dr. Melendrez's guidance, 
his primary cardiologist should be pursued.

 

 [id number]/CPS #: [id number]

MTDD

## 2018-12-05 NOTE — CATH
CC:  Lebron Reardon MD; Surjit Medeiros MD *

 

CARDIAC CATHETERIZATION REPORT:

 

DATE OF PROCEDURE:  12/04/18 - ROOM #452

 

PROCEDURE:  Cardiac catheterization including coronary angiography.

 

INDICATION:  Acute coronary syndrome.

 

INDICATION:  The patient is a 53-year-old gentleman with little past medical 
history who was admitted to the hospital with chest pain, positive troponin, 
consistent with acute coronary syndrome.

 

An echocardiogram done just before the procedure demonstrated low-normal LV 
systolic function.  Ejection fraction 

45-50%.  There was inferior wall hypokinesis. No significant valvular 
abnormalities.

 

DESCRIPTION OF PROCEDURE:  The patient was brought to the cardiac 
catheterization lab in a fasting state.  Informed consent had been obtained 
prior to the procedure. All labs were reviewed.  The patient was placed supine 
on the procedure table.  His right radial wrist area was prepped and draped in 
the usual fashion.  1% lidocaine was used for local anesthesia.  The radial 
artery was entered by a Seldinger technique and a guidewire was placed.  Over 
the guidewire, a 6-Tunisian hydrophilic sheath was placed through which a 
cocktail of heparin, nitroglycerin, and verapamil was infused.  The patient 
underwent coronary angiography using a 6-Tunisian AR1 catheter and a 6-Tunisian AL1 
catheter.  At the end of the procedure, the patient went on to angioplasty and 
stenting of his right coronary artery.  Please see Dr. Medeiros's note for that 
procedure.  The patient had a total of 80 mg of contrast and 4.5 minutes of 
fluoro time were used.

 

FINDINGS:

1.  Left main artery:  The left main was normal in size.  It bifurcated into 
the LAD and circumflex.  There is no evidence of stenosis.

2.  Left anterior descending artery:  The LAD was normal in size.  It gave off 
2 diagonal vessels.  The LAD itself had a mid 30% stenosis.  The distal portion 
of the LAD had a 20% stenosis.

3.  Left circumflex artery:  The circumflex artery was normal in size.  It gave 
off 3 obtuse marginal branches.  The proximal left circumflex artery was 
without disease.  The mid left circumflex artery just before the second obtuse 
marginal had an eccentric 60% stenosis.  The first OM vessel had an ostial 60% 
stenosis.  The overall size of the first obtuse marginal was a 2 mm vessel.

 

The left circumflex artery continued in the AV groove and did not have any 
significant disease.  The third obtuse marginal had a branch vessel with a 70% 
stenosis to it.

 

Right coronary artery:  The RCA was occluded in the mid vessel.  There was 
evidence of left to right collaterals from the LAD.

 

IMPRESSION:

1.  Occluded right coronary artery with left to right collaterals from the LAD 
which were faint.

2.  60% stenosis to the mid left circumflex artery.

3.  70% stenosis to the branch vessel of OM3.

4.  Mild disease to the LAD.

 

RECOMMENDATION:  The patient will undergo angioplasty and stenting of the right 
coronary artery.

 

 [id number]/CPS #: [id number]

CHAZ

## 2018-12-06 VITALS — DIASTOLIC BLOOD PRESSURE: 77 MMHG | SYSTOLIC BLOOD PRESSURE: 130 MMHG

## 2018-12-06 LAB
HCT VFR BLD AUTO: 41 % (ref 42–52)
HGB BLD-MCNC: 14.5 G/DL (ref 14–18)
MCH RBC QN AUTO: 33 PG (ref 27–31)
MCHC RBC AUTO-ENTMCNC: 35 G/DL (ref 31–36)
MCV RBC AUTO: 94 FL (ref 80–94)
PLATELET # BLD AUTO: 173 10^3/UL (ref 150–450)
RBC # BLD AUTO: 4.41 10^6/UL (ref 4–5.4)
WBC # BLD AUTO: 8.5 10^3/UL (ref 3.5–10.8)

## 2018-12-06 RX ADMIN — ASPIRIN SCH MG: 81 TABLET, CHEWABLE ORAL at 08:04

## 2018-12-06 RX ADMIN — METOPROLOL SUCCINATE SCH MG: 25 TABLET, EXTENDED RELEASE ORAL at 08:04

## 2018-12-06 RX ADMIN — TICAGRELOR SCH MG: 90 TABLET ORAL at 08:04

## 2018-12-06 NOTE — DS
DISCHARGE SUMMARY:

 

DATE OF ADMISSION:  12/04/18

 

DATE OF DISCHARGE:  12/06/18

 

PRIMARY CARE PROVIDER:  Dr. Reardon.

 

CARDIOLOGIST:  Dr. Melendrez.

 

PRINCIPAL DIAGNOSIS:  Non-ST elevation MI.

 

HOSPITAL COURSE:  Mr. Hamilton is a 53-year-old male who presented to the 
emergency room on 12/04/18 with complaints of chest pain.  Chest pain had begun 
on the evening prior to admission.  He thought it was heartburn, so he took 
some Tums and had some mild relief, but was uncomfortable the entire night.  
When he woke up in the morning, he was still having discomfort and thus decided 
to present to the emergency room for evaluation.  In the ER, the patient was 
found to have a troponin of 3.06.  On EKG, he had J-point elevation and 
nondiagnostic Q-waves in the inferior leads.  It was determined that the 
patient should undergo urgent cardiac catheterization.  The patient underwent 
catheterization initially by Dr. Melendrez who identified an occluded right 
coronary with left to right collateral from the LAD, which were faint.  A 60% 
stenosis to the mid left circumflex artery and 70% stenosis to the branch 
vessel of OM3.  Dr. Medeiros then took over the catheterization and placed a drug-
eluting stent to the RCA lesion.  Balloon angioplasty of the PDA was also 
performed.  The patient was subsequently started on Brilinta in addition to 
aspirin.  He was also started on metoprolol XL  and high dose Lipitor.  These 
will all be continued in addition to p.r.n. nitroglycerin. Dr. Max did make 
note on her last followup visit of 12/05/18 that outpatient consideration for 
procoagulant state could be considered, especially as the patient does not have 
significant coronary risk factors.  The patient has been instructed to follow 
up with Dr. Melendrez on 

12/11/18 at 2 p.m.  Activity level is as per post cath instructions.

 

Of note, prior to discharge, the patient did notice scant amounts of blood in 
his urine.  My suspicion is he may have blood related to being on heparin, 
aspirin, and Brilinta.  The patient has been instructed to continue to monitor 
his urine for further signs of bleeding.  He has been instructed that he 
absolutely cannot stop his aspirin or Brilinta for any reason without 
discussing with Dr. Melendrez first. The patient has also been instructed if the 
hematuria continues for the next several days, to contact his primary care 
provider to get a referral to Urology for potential cystoscopy.

 

On the day of discharge, the patient is awake, alert, and oriented, sitting up 
in bed, in no acute distress.  His cardiac exam reveals a normal S1, S2 with a 
regular rate and rhythm.  His lungs were clear.  He had no lower extremity 
edema.  His abdomen is soft, nontender, and nondistended.

 

FOLLOWUP CONCERNS:  The patient is being discharged home today, 12/06/18.  
Activity level is as per post cath instructions.

 

CONDITION ON DISCHARGE:  Stable.

 

TIME SPENT:  Thirty five minutes was spent discharging this patient.

 

 [id number] #: [id number]

MTDD

## 2023-12-06 NOTE — ED
HPI Chest Pain





- HPI Summary


HPI Summary: 





This pt is a 54 y/o male presenting to [facility] c/o midsternal chest pain since 

last night. Pt reports his chest pain began around 22:00, which he describes as 

pressure, burning, and nonradiating. He notes he did not have a big dinner 

prior to onset of chest pain, at around 18:30 pt had a sandwich at the [employer] 

[employer] that included cheese, pesto, caramelized onions, spinach, diet coke, and 

a few chips. Pt took Tums yesterday with minimal relief of chest pain. Upon 

waking up this morning pt states his chest pain persisted. Additionally reports 

nausea. He currently rates his pain 7/10 in severity. Denies vomiting, 

diaphoresis, SOB, palpitations, lightheadedness, dizziness. Chest pain is 

mildly alleviated with ambulation and it is aggravated with lying still. 


Pt has never had this pain in the past. 


Denies any PMHx. He does not take medications on a daily basis. 


Denies tobacco and drug use, but admits to a few drinks of alcohol a week. He 

did not drink alcohol yesterday. 


FHx of grandfather with fatal MI at an age younger than 55 years old.





- History of Current Complaint


Chief Complaint: EDChestPainROMI


Time Seen by Provider: 12/04/18 07:48


Hx Obtained From: Patient


Onset/Duration: Started Hours Ago, Still Present


Timing: Lasting Hours


Current Severity: Moderate


Pain Intensity: 7


Pain Scale Used: 0-10 Numeric


Chest Pain Location: Mid Sternal


Chest Pain Radiates: No


Character: Burning, Pressure/Squeezing - Pressure


Aggravating Factor(s): Rest


Alleviating Factor(s): Other: - ambulating


Associated Signs and Symptoms: Positive: Chest Pain, Nausea.  Negative: 

Dizziness, Shortness of Breath, Fever, Chills, Lightheadedness, Diaphoresis, 

Palpitations, Vomiting





- Allergy/Home Medications


Allergies/Adverse Reactions: 


 Allergies











Allergy/AdvReac Type Severity Reaction Status Date / Time


 


No Known Allergies Allergy   Verified 06/09/17 22:23











Home Medications: 


 Home Medications





NK [No Home Medications Reported]  12/04/18 [History Confirmed 12/04/18]











PMH/Surg Hx/FS Hx/Imm Hx


Endocrine/Hematology History: 


   Denies: Hx Anticoagulant Therapy, Hx Blood Disorders, Hx Diabetes


Cardiovascular History: 


   Denies: Hx Hypertension





- Surgical History


Surgery Procedure, Year, and Place: Tonsillectomy





- Immunization History


Date of Tetanus Vaccine: unknown


Infectious Disease History: No


Infectious Disease History: 


   Denies: Traveled Outside the [place] in Last 30 Days





- Family History


Known Family History: Positive: Cardiac Disease - Grandfather with fatal MI at 

an age earlier than 55


Family History: Mother with CA





- Social History


Alcohol Use: Occasionally


Hx Substance Use: No


Substance Use Type: Reports: None


Hx Tobacco Use: No


Smoking Status (MU): Never Smoked Tobacco





Review of Systems


Negative: Fever, Chills, Skin Diaphoresis


Positive: Chest Pain.  Negative: Palpitations


Negative: Shortness Of Breath


Positive: Nausea.  Negative: Vomiting


Neurological: Other - NEG: lightheadedness, dizziness


All Other Systems Reviewed And Are Negative: Yes





Physical Exam





- Summary


Physical Exam Summary: 





VITAL SIGNS: Reviewed.


GENERAL: Patient is a well-developed and nourished male who is lying 

comfortable in the stretcher. Patient is not in any acute respiratory distress.


HEAD AND FACE: No signs of trauma. No ecchymosis, hematomas or skull 

depressions. No sinus tenderness.


EYES: PERRLA, EOMI x 2, No injected conjunctiva, no nystagmus.


EARS: Hearing grossly intact. Ear canals and tympanic membranes are within 

normal limits.


MOUTH: Oropharynx within normal limits.


NECK: Supple, trachea is midline, no adenopathy, no JVD, no carotid bruit, no c-

spine tenderness, neck with full ROM.


CHEST: Symmetric, no tenderness at palpation


LUNGS: Clear to auscultation bilaterally. No wheezing or crackles.


CVS: Regular rate and rhythm, S1 and S2 present, no murmurs or gallops 

appreciated.


ABDOMEN: Soft, non-tender. No signs of distention. No rebound, no guarding, and 

no masses palpated. Bowel sounds are normal.


EXTREMITIES: FROM in all major joints, no edema, no cyanosis or clubbing.


NEURO: Alert and oriented x 3. No acute neurological deficits. Speech is normal 

and follows commands.


SKIN: Dry and warm


Triage Information Reviewed: Yes


Vital Signs On Initial Exam: 


 Initial Vitals











Temp Pulse Resp BP Pulse Ox


 


 97.6 F   52   16   145/90   100 


 


 12/04/18 07:46  12/04/18 07:46  12/04/18 07:46  12/04/18 07:46  12/04/18 07:46











Vital Signs Reviewed: Yes





Diagnostics





- Vital Signs


 Vital Signs











  Temp Pulse Resp BP Pulse Ox


 


 12/04/18 07:46  97.6 F  52  16  145/90  100














- Laboratory


Result Diagrams: 


 12/04/18 08:01





 12/04/18 08:01


Lab Statement: Any lab studies that have been ordered have been reviewed, and 

results considered in the medical decision making process.





- Radiology


  ** Chest XR


Radiology Interpretation Completed By: Radiologist


Summary of Radiographic Findings: IMPRESSION: No active cardiopulmonary disease 

is noted.  Dr. Barragan has reviewed this report.





- EKG


  ** 08:08


Cardiac Rate: Bradycardia - at 49 bpm


EKG Rhythm: Sinus Bradycardia


EKG Comparison: Other - no old EKG for comparison


Summary of EKG Findings: No ST elevations. Q wave in leads II, III, and aVF.





Re-Evaluation





- Re-Evaluation


  ** First Eval


Re-Evaluation Time: 08:47


Comment: I discussed the test results with the pt. Awaiting cardiology consult.





  ** Second Eval


Re-Evaluation Time: 08:54


Comment: Dr. Melendrez, cardiologist, is at bedside.





Chest Pain Course/Dx





- Course


Assessment/Plan: This pt is a 54 y/o male [facility]/o midsternal 

chest pain since last night. Pt reports his chest pain began around 22:00, 

which he describes as pressure, burning, and nonradiating. He notes he did not 

have a big dinner prior to onset of chest pain, at around 18:30 pt had a 

sandwich at the [employer] that included cheese, pesto, caramelized onions, 

spinach, diet coke, and a few chips. Pt took Tums yesterday with minimal relief 

of chest pain. Upon waking up this morning pt states his chest pain persisted. 

Additionally reports nausea. He currently rates his pain 7/10 in severity. 

Denies vomiting, diaphoresis, SOB, palpitations, lightheadedness, dizziness. 

Chest pain is mildly alleviated with ambulation and it is aggravated with lying 

still.  Pt has never had this pain in the past.  Denies any PMHx. He does not 

take medications on a daily basis.  Denies tobacco and drug use, but admits to 

a few drinks of alcohol a week. He did not drink alcohol yesterday.  FHx of 

grandfather with fatal MI at an age younger than 55 years old.  Blood test 

results without any significant abnormality except for glucose 121, AST 66, CPK 

is 542, CK-MB is 107, troponin is 3.06. Urinalysis is negative for UTI.  Chest x

-ray impression: No acute pathology.  In the ED course the patient was given 

aspirin, nitroglycerin and Lopressor.  Troponin is elevated therefore the 

patient was given heparin.  At this point I discussed my physical exam and 

findings with Dr. Melendrez from cardiology who came and assessed the patient.  

After his assessment he thinks that the patient has a non-STEMI and he requests 

for the patient to be admitted to hospitalist service to the ICU and likely the 

patient will go to the cath lab.  At this point the patient is hemodynamically 

stable, alert and oriented 3.  I discussed my physical exam, findings and test 

results with Dr. Montoya from the hospitalist services and she agrees to admit 

patient to her services. Patient is hemodynamically stable alert and oriented x 

3.





- Chest Pain


Differential Diagnosis/HQI/PQRI: Acute MI, ACS, Angina, CHF, Chest Wall, GI 

Disease, Lower Respiratory Infection, Pulmonary Edema





- Diagnoses


Provider Diagnoses: 


 Non-STEMI (non-ST elevated myocardial infarction)








- Provider Notifications


Discussed Care Of Patient With: Berny Melendrez


Time Discussed With Above Provider: 08:50


Instructed by Provider To: Other - I discussed the case with Dr. Melendrez, 

cardiologist, who will come and see the pt in the ED. [09:12] I discussed with 

Dr. Montoya, hospitalist, who accepted the pt for admission.





- Critical Care Time


Critical Care Time:  min





Discharge





- Sign-Out/Discharge


Documenting (check all that apply): Patient Departure - Ad[place] [facility]





- Discharge Plan


Condition: Stable


Disposition: ADMITTED TO [facility]





- Billing Disposition and Condition


Condition: STABLE


Disposition: Admitted to [facility]





- Attestation Statements


Document Initiated by Keyannae: Yes


Documenting Scribe: Olena Reaves


Provider For Whom Ga is Documenting (Include Credential): Marbin Barragan MD


Scribe Attestation: 


Olena VELIZ, scribed for Marbin Barragan MD on 12/04/18 at 1830. 


Scribe Documentation Reviewed: Yes


Provider Attestation: 


The documentation as recorded by the solibradha, Olena Lozanooa accurately reflects 

the service I personally performed and the decisions made by me, Marbin Barragan MD


Status of Ga Document: Viewed Statement Selected